# Patient Record
Sex: FEMALE
[De-identification: names, ages, dates, MRNs, and addresses within clinical notes are randomized per-mention and may not be internally consistent; named-entity substitution may affect disease eponyms.]

---

## 2023-04-03 ENCOUNTER — APPOINTMENT (OUTPATIENT)
Dept: OTOLARYNGOLOGY | Facility: CLINIC | Age: 78
End: 2023-04-03
Payer: MEDICARE

## 2023-04-03 ENCOUNTER — NON-APPOINTMENT (OUTPATIENT)
Age: 78
End: 2023-04-03

## 2023-04-03 VITALS
OXYGEN SATURATION: 95 % | DIASTOLIC BLOOD PRESSURE: 84 MMHG | HEIGHT: 63 IN | RESPIRATION RATE: 16 BRPM | SYSTOLIC BLOOD PRESSURE: 143 MMHG | HEART RATE: 67 BPM | WEIGHT: 164 LBS | BODY MASS INDEX: 29.06 KG/M2

## 2023-04-03 PROBLEM — Z00.00 ENCOUNTER FOR PREVENTIVE HEALTH EXAMINATION: Status: ACTIVE | Noted: 2023-04-03

## 2023-04-03 PROCEDURE — 99203 OFFICE O/P NEW LOW 30 MIN: CPT

## 2023-04-11 NOTE — CONSULT LETTER
[Dear  ___] : Dear  [unfilled], [Consult Letter:] : I had the pleasure of evaluating your patient, [unfilled]. [Please see my note below.] : Please see my note below. [Consult Closing:] : Thank you very much for allowing me to participate in the care of this patient.  If you have any questions, please do not hesitate to contact me. [Sincerely,] : Sincerely, [FreeTextEntry3] : Dhruv Avila MD, FACS\par Professor of Otolaryngology, [employer] [facility] at [facility]\par Director, [facility], Department of Otolaryngology, [facility]\par , Head & Neck Service Line, [facility]\par

## 2023-04-11 NOTE — REASON FOR VISIT
[Initial Evaluation] : an initial evaluation for [FreeTextEntry2] : Lt ear fullness and pressure Lt sinuses DNS

## 2023-04-11 NOTE — PHYSICAL EXAM
[Midline] : trachea located in midline position [Normal] : no rashes [de-identified] : Lt ear fullness and pressure Lt sinuses DNS

## 2023-07-26 ENCOUNTER — OFFICE (OUTPATIENT)
Dept: URBAN - METROPOLITAN AREA CLINIC 29 | Facility: CLINIC | Age: 78
Setting detail: OPHTHALMOLOGY
End: 2023-07-26
Payer: MEDICARE

## 2023-07-26 DIAGNOSIS — H01.001: ICD-10-CM

## 2023-07-26 DIAGNOSIS — H01.002: ICD-10-CM

## 2023-07-26 DIAGNOSIS — H01.004: ICD-10-CM

## 2023-07-26 DIAGNOSIS — H43.813: ICD-10-CM

## 2023-07-26 DIAGNOSIS — H43.393: ICD-10-CM

## 2023-07-26 DIAGNOSIS — H01.005: ICD-10-CM

## 2023-07-26 DIAGNOSIS — Z96.1: ICD-10-CM

## 2023-07-26 PROCEDURE — 92202 OPSCPY EXTND ON/MAC DRAW: CPT | Performed by: OPHTHALMOLOGY

## 2023-07-26 PROCEDURE — 92014 COMPRE OPH EXAM EST PT 1/>: CPT | Performed by: OPHTHALMOLOGY

## 2023-07-26 ASSESSMENT — KERATOMETRY
OS_K1POWER_DIOPTERS: 44.50
OD_K1POWER_DIOPTERS: 44.50
OS_AXISANGLE_DEGREES: 175
OD_K2POWER_DIOPTERS: 46.25
OD_AXISANGLE_DEGREES: 176
OS_K2POWER_DIOPTERS: 46.00

## 2023-07-26 ASSESSMENT — VISUAL ACUITY
OS_BCVA: 20/25-1
OD_BCVA: 20/25-1

## 2023-07-26 ASSESSMENT — CONFRONTATIONAL VISUAL FIELD TEST (CVF)
OS_FINDINGS: FULL
OD_FINDINGS: FULL

## 2023-07-26 ASSESSMENT — TONOMETRY
OS_IOP_MMHG: 13
OD_IOP_MMHG: 14

## 2023-07-26 ASSESSMENT — LID EXAM ASSESSMENTS
OD_BLEPHARITIS: RLL RUL T
OS_BLEPHARITIS: LLL LUL T

## 2023-09-05 ENCOUNTER — OFFICE (OUTPATIENT)
Dept: URBAN - METROPOLITAN AREA CLINIC 29 | Facility: CLINIC | Age: 78
Setting detail: OPHTHALMOLOGY
End: 2023-09-05
Payer: MEDICARE

## 2023-09-05 DIAGNOSIS — H11.31: ICD-10-CM

## 2023-09-05 DIAGNOSIS — Z96.1: ICD-10-CM

## 2023-09-05 DIAGNOSIS — H01.005: ICD-10-CM

## 2023-09-05 DIAGNOSIS — H01.001: ICD-10-CM

## 2023-09-05 DIAGNOSIS — H01.002: ICD-10-CM

## 2023-09-05 DIAGNOSIS — H01.004: ICD-10-CM

## 2023-09-05 PROCEDURE — 99213 OFFICE O/P EST LOW 20 MIN: CPT | Performed by: OPHTHALMOLOGY

## 2023-09-05 ASSESSMENT — REFRACTION_MANIFEST
OD_AXIS: 005
OD_VA1: 20/25-
OS_SPHERE: -1.25
OS_ADD: +2.75
OD_VA1: 20/25-
OD_CYLINDER: +1.75
OS_SPHERE: -1.25
OS_CYLINDER: +1.25
OD_ADD: +2.75
OD_VA2: 20/J1+
OS_CYLINDER: +1.25
OS_VA1: 20/20-
OS_AXIS: 180
OS_VA2: 20/J1+
OS_AXIS: 180
OD_AXIS: 005
OS_ADD: +2.75
OD_SPHERE: -1.00
OD_SPHERE: -1.00
OS_VA1: 20/20-
OD_CYLINDER: +1.75
OD_ADD: +2.75

## 2023-09-05 ASSESSMENT — REFRACTION_CURRENTRX
OS_ADD: +3.00
OD_OVR_VA: 20/
OD_AXIS: 006
OD_CYLINDER: +1.00
OS_SPHERE: -0.75
OD_ADD: +3.00
OS_AXIS: 167
OD_SPHERE: -1.00
OS_OVR_VA: 20/
OS_CYLINDER: +1.25

## 2023-09-05 ASSESSMENT — KERATOMETRY
OD_AXISANGLE_DEGREES: 176
OS_K2POWER_DIOPTERS: 46.00
OS_AXISANGLE_DEGREES: 175
OS_K1POWER_DIOPTERS: 44.50
OD_K1POWER_DIOPTERS: 44.50
OD_K2POWER_DIOPTERS: 46.25

## 2023-09-05 ASSESSMENT — SPHEQUIV_DERIVED
OS_SPHEQUIV: -0.625
OD_SPHEQUIV: -0.125
OS_SPHEQUIV: -0.625
OD_SPHEQUIV: -0.125
OS_SPHEQUIV: -0.5
OD_SPHEQUIV: -0.125

## 2023-09-05 ASSESSMENT — TONOMETRY
OS_IOP_MMHG: 15
OD_IOP_MMHG: 14

## 2023-09-05 ASSESSMENT — CONFRONTATIONAL VISUAL FIELD TEST (CVF)
OD_FINDINGS: FULL
OS_FINDINGS: FULL

## 2023-09-05 ASSESSMENT — VISUAL ACUITY
OD_BCVA: 20/20-1
OS_BCVA: 20/20-1

## 2023-09-05 ASSESSMENT — REFRACTION_AUTOREFRACTION
OS_CYLINDER: +1.50
OD_CYLINDER: +1.75
OD_AXIS: 003
OS_SPHERE: -1.25
OS_AXIS: 177
OD_SPHERE: -1.00

## 2023-09-05 ASSESSMENT — AXIALLENGTH_DERIVED
OD_AL: 22.9688
OS_AL: 23.1523
OS_AL: 23.1993
OD_AL: 22.9688
OS_AL: 23.1993
OD_AL: 22.9688

## 2023-09-05 ASSESSMENT — LID EXAM ASSESSMENTS
OS_BLEPHARITIS: LLL LUL T
OD_BLEPHARITIS: RLL RUL T

## 2024-02-07 ASSESSMENT — REFRACTION_MANIFEST
OD_ADD: +2.75
OD_VA1: 20/25-
OD_CYLINDER: +1.75
OS_VA2: 20/J1+
OS_CYLINDER: +1.25
OS_VA1: 20/20-
OS_AXIS: 180
OD_VA2: 20/J1+
OD_SPHERE: -1.00
OS_CYLINDER: +1.25
OS_SPHERE: -1.25
OS_VA1: 20/20-
OD_AXIS: 005
OD_AXIS: 005
OS_ADD: +2.75
OD_SPHERE: -1.00
OD_CYLINDER: +1.75
OD_CYLINDER: +1.75
OS_AXIS: 180
OD_ADD: +2.75
OD_VA1: 20/25-
OS_CYLINDER: +1.25
OS_ADD: +2.75
OS_SPHERE: -1.25
OD_ADD: +2.75
OS_SPHERE: -1.25
OS_ADD: +2.75
OD_SPHERE: -1.00
OD_AXIS: 005
OS_AXIS: 180
OS_VA1: 20/20-
OD_VA1: 20/25-

## 2024-02-07 ASSESSMENT — REFRACTION_CURRENTRX
OD_AXIS: 006
OD_OVR_VA: 20/
OD_SPHERE: -1.00
OS_ADD: +3.00
OD_ADD: +3.00
OS_OVR_VA: 20/
OD_CYLINDER: +1.00
OS_AXIS: 167
OS_CYLINDER: +1.25
OS_SPHERE: -0.75

## 2024-02-07 ASSESSMENT — KERATOMETRY
OS_AXISANGLE_DEGREES: 175
OS_K2POWER_DIOPTERS: 46.00
OD_AXISANGLE_DEGREES: 176
OS_K1POWER_DIOPTERS: 44.50
OD_K1POWER_DIOPTERS: 44.50
OD_K2POWER_DIOPTERS: 46.25

## 2024-02-07 ASSESSMENT — REFRACTION_AUTOREFRACTION
OD_SPHERE: -1.00
OD_CYLINDER: +1.75
OS_CYLINDER: +1.50
OD_AXIS: 003
OS_SPHERE: -1.25
OS_AXIS: 177

## 2024-02-07 ASSESSMENT — SPHEQUIV_DERIVED
OD_SPHEQUIV: -0.125
OD_SPHEQUIV: -0.125
OS_SPHEQUIV: -0.625
OS_SPHEQUIV: -0.625
OD_SPHEQUIV: -0.125
OD_SPHEQUIV: -0.125
OS_SPHEQUIV: -0.625
OS_SPHEQUIV: -0.5

## 2024-02-07 ASSESSMENT — AXIALLENGTH_DERIVED
OD_AL: 22.9688
OS_AL: 23.1993
OD_AL: 22.9688
OD_AL: 22.9688
OS_AL: 23.1523
OS_AL: 23.1993
OD_AL: 22.9688
OS_AL: 23.1993

## 2024-08-22 ENCOUNTER — RX ONLY (RX ONLY)
Age: 79
End: 2024-08-22

## 2024-08-22 ENCOUNTER — OFFICE (OUTPATIENT)
Dept: URBAN - METROPOLITAN AREA CLINIC 29 | Facility: CLINIC | Age: 79
Setting detail: OPHTHALMOLOGY
End: 2024-08-22
Payer: MEDICARE

## 2024-08-22 DIAGNOSIS — H01.002: ICD-10-CM

## 2024-08-22 DIAGNOSIS — H01.004: ICD-10-CM

## 2024-08-22 DIAGNOSIS — H43.393: ICD-10-CM

## 2024-08-22 DIAGNOSIS — H43.813: ICD-10-CM

## 2024-08-22 DIAGNOSIS — Z96.1: ICD-10-CM

## 2024-08-22 DIAGNOSIS — H01.001: ICD-10-CM

## 2024-08-22 DIAGNOSIS — H01.005: ICD-10-CM

## 2024-08-22 PROCEDURE — 92014 COMPRE OPH EXAM EST PT 1/>: CPT | Performed by: OPHTHALMOLOGY

## 2024-08-22 ASSESSMENT — LID EXAM ASSESSMENTS
OD_BLEPHARITIS: RLL RUL T
OS_BLEPHARITIS: LLL LUL T

## 2024-08-22 ASSESSMENT — CONFRONTATIONAL VISUAL FIELD TEST (CVF)
OD_FINDINGS: FULL
OS_FINDINGS: FULL

## 2025-08-19 ENCOUNTER — RX ONLY (RX ONLY)
Age: 80
End: 2025-08-19

## 2025-08-19 ENCOUNTER — OFFICE (OUTPATIENT)
Dept: URBAN - METROPOLITAN AREA CLINIC 29 | Facility: CLINIC | Age: 80
Setting detail: OPHTHALMOLOGY
End: 2025-08-19
Payer: MEDICARE

## 2025-08-19 DIAGNOSIS — H01.002: ICD-10-CM

## 2025-08-19 DIAGNOSIS — H01.004: ICD-10-CM

## 2025-08-19 DIAGNOSIS — H01.005: ICD-10-CM

## 2025-08-19 DIAGNOSIS — H43.393: ICD-10-CM

## 2025-08-19 DIAGNOSIS — Z96.1: ICD-10-CM

## 2025-08-19 DIAGNOSIS — H43.813: ICD-10-CM

## 2025-08-19 DIAGNOSIS — H16.223: ICD-10-CM

## 2025-08-19 DIAGNOSIS — H01.001: ICD-10-CM

## 2025-08-19 PROCEDURE — 92014 COMPRE OPH EXAM EST PT 1/>: CPT | Performed by: OPHTHALMOLOGY

## 2025-08-19 ASSESSMENT — REFRACTION_MANIFEST
OD_AXIS: 005
OS_VA1: 20/20-
OS_CYLINDER: +1.25
OS_CYLINDER: +1.75
OS_CYLINDER: +1.25
OD_ADD: +2.75
OD_SPHERE: -1.00
OS_VA2: 20/J1+
OS_SPHERE: -1.25
OD_CYLINDER: +1.75
OS_ADD: +2.75
OS_ADD: +2.75
OS_AXIS: 180
OD_AXIS: 180
OD_CYLINDER: +1.75
OS_SPHERE: -1.25
OD_VA1: 20/25-
OS_AXIS: 180
OD_ADD: +2.75
OD_ADD: +2.75
OS_VA1: 20/20-
OD_SPHERE: -1.00
OS_ADD: +2.75
OD_VA1: 20/25-
OS_AXIS: 180
OD_VA2: 20/J1+
OD_SPHERE: -1.00
OD_AXIS: 005
OD_CYLINDER: +1.50
OS_SPHERE: -1.25

## 2025-08-19 ASSESSMENT — REFRACTION_CURRENTRX
OD_SPHERE: -1.00
OS_ADD: +2.75
OD_OVR_VA: 20/
OS_OVR_VA: 20/
OD_AXIS: 5
OD_CYLINDER: +1.75
OS_CYLINDER: +1.25
OD_ADD: +2.75
OS_AXIS: 180
OS_SPHERE: -1.25

## 2025-08-19 ASSESSMENT — LID EXAM ASSESSMENTS
OD_BLEPHARITIS: RLL RUL T
OS_BLEPHARITIS: LLL LUL T

## 2025-08-19 ASSESSMENT — CONFRONTATIONAL VISUAL FIELD TEST (CVF)
OS_FINDINGS: FULL
OD_FINDINGS: FULL

## 2025-08-19 ASSESSMENT — KERATOMETRY
OS_K2POWER_DIOPTERS: 46.00
OD_K2POWER_DIOPTERS: 46.25
OS_K1POWER_DIOPTERS: 44.50
OS_AXISANGLE_DEGREES: 175
OD_AXISANGLE_DEGREES: 176
OD_K1POWER_DIOPTERS: 44.50

## 2025-08-19 ASSESSMENT — SUPERFICIAL PUNCTATE KERATITIS (SPK)
OS_SPK: 1+
OD_SPK: 1+

## 2025-08-19 ASSESSMENT — REFRACTION_AUTOREFRACTION
OS_SPHERE: -1.00
OD_SPHERE: PLANO
OD_AXIS: 024
OD_CYLINDER: +0.25
OS_AXIS: 178
OS_CYLINDER: +1.25

## 2025-08-19 ASSESSMENT — VISUAL ACUITY
OD_BCVA: 20/25+2
OS_BCVA: 20/20-1

## 2025-08-19 ASSESSMENT — TONOMETRY
OD_IOP_MMHG: 15
OS_IOP_MMHG: 16